# Patient Record
Sex: FEMALE | Race: AMERICAN INDIAN OR ALASKA NATIVE
[De-identification: names, ages, dates, MRNs, and addresses within clinical notes are randomized per-mention and may not be internally consistent; named-entity substitution may affect disease eponyms.]

---

## 2019-09-10 ENCOUNTER — HOSPITAL ENCOUNTER (EMERGENCY)
Dept: HOSPITAL 5 - ED | Age: 29
Discharge: HOME | End: 2019-09-10
Payer: MEDICAID

## 2019-09-10 VITALS — SYSTOLIC BLOOD PRESSURE: 130 MMHG | DIASTOLIC BLOOD PRESSURE: 63 MMHG

## 2019-09-10 DIAGNOSIS — O99.611: Primary | ICD-10-CM

## 2019-09-10 DIAGNOSIS — E28.2: ICD-10-CM

## 2019-09-10 DIAGNOSIS — D27.0: ICD-10-CM

## 2019-09-10 DIAGNOSIS — O34.81: ICD-10-CM

## 2019-09-10 DIAGNOSIS — O23.41: ICD-10-CM

## 2019-09-10 DIAGNOSIS — Z79.899: ICD-10-CM

## 2019-09-10 DIAGNOSIS — Z3A.10: ICD-10-CM

## 2019-09-10 DIAGNOSIS — K92.0: ICD-10-CM

## 2019-09-10 LAB
BASOPHILS # (AUTO): 0 K/MM3 (ref 0–0.1)
BASOPHILS NFR BLD AUTO: 0.4 % (ref 0–1.8)
EOSINOPHIL # BLD AUTO: 0 K/MM3 (ref 0–0.4)
EOSINOPHIL NFR BLD AUTO: 0.3 % (ref 0–4.3)
HCT VFR BLD CALC: 35.4 % (ref 30.3–42.9)
HGB BLD-MCNC: 12.1 GM/DL (ref 10.1–14.3)
LYMPHOCYTES # BLD AUTO: 2.5 K/MM3 (ref 1.2–5.4)
LYMPHOCYTES NFR BLD AUTO: 28.8 % (ref 13.4–35)
MCHC RBC AUTO-ENTMCNC: 34 % (ref 30–34)
MCV RBC AUTO: 90 FL (ref 79–97)
MONOCYTES # (AUTO): 1 K/MM3 (ref 0–0.8)
MONOCYTES % (AUTO): 11.2 % (ref 0–7.3)
PH UR STRIP: 6 [PH] (ref 5–7)
PLATELET # BLD: 282 K/MM3 (ref 140–440)
RBC # BLD AUTO: 3.95 M/MM3 (ref 3.65–5.03)
RBC #/AREA URNS HPF: 2 /HPF (ref 0–6)
UROBILINOGEN UR-MCNC: 2 MG/DL (ref ?–2)
WBC #/AREA URNS HPF: 25 /HPF (ref 0–6)

## 2019-09-10 PROCEDURE — 84702 CHORIONIC GONADOTROPIN TEST: CPT

## 2019-09-10 PROCEDURE — 86900 BLOOD TYPING SEROLOGIC ABO: CPT

## 2019-09-10 PROCEDURE — 36415 COLL VENOUS BLD VENIPUNCTURE: CPT

## 2019-09-10 PROCEDURE — 86901 BLOOD TYPING SEROLOGIC RH(D): CPT

## 2019-09-10 PROCEDURE — 87086 URINE CULTURE/COLONY COUNT: CPT

## 2019-09-10 PROCEDURE — 85025 COMPLETE CBC W/AUTO DIFF WBC: CPT

## 2019-09-10 PROCEDURE — 81001 URINALYSIS AUTO W/SCOPE: CPT

## 2019-09-10 NOTE — EMERGENCY DEPARTMENT REPORT
ED Pregnancy HPI





- General


Chief complaint: Nausea/Vomiting/Diarrhea


Stated complaint: 10 WK PREG/VOMITING BLOOD


Time Seen by Provider: 09/10/19 18:11


Source: patient


Mode of arrival: Ambulatory


Limitations: No Limitations





- History of Present Illness


Initial comments: 





Ramesh is a very pleasant 29-year-old female with history of PCOS and ovarian 

dermoid cyst who is currently 10 weeks 5 days pregnant who presents with 2 

episodes of vomiting over the past 24 hours.  She vomited bright red blood today

one episode.  She currently denies abdominal pain.  No vomiting.  Her OB/GYN Dr. Duran asked her to be evaluated in the emergency department.  She is currently 

symptom-free.  She did not have morning sickness.  This is her first pregnancy. 

Her estimated due date is 2020.  She plans to deliver her baby at [place]

or [facility].


MD Complaint: other (nondescript abdominal pain resolved.  Concern about 

hematemesis)


-: days(s) (1)


Severity: mild


Quality: cramping


Consistency: now resolved


Improves with: none


Worsens with: none


Associated symptoms: nausea/vomiting, other (hematemesis)


Pregnant:: Yes


Number of weeks pregnant: 10


OB History - Current Pregnancy: no complications


OB History - Previous Pregnancies: other (no previous pregnancies)


Pre-christianne care: followed by OB, previous ultrasound confi





- Related Data


                                  Previous Rx's











 Medication  Instructions  Recorded  Last Taken  Type


 


metroNIDAZOLE [Flagyl TAB] 500 mg PO Q12HR #14 tab 12/21/15 Unknown Rx


 


traMADol [Ultram 50 MG tab] 50 mg PO Q6HR PRN #20 tablet 12/21/15 Unknown Rx


 


Nitrofurantoin Mono/M-Cryst 100 mg PO Q12HR 10 Days #20 capsule 09/10/19 Unknown

Rx





[Macrobid CAP]    


 


Promethazine [Phenergan] 25 mg PO Q6HR PRN #15 tab 09/10/19 Unknown Rx











                                    Allergies











Allergy/AdvReac Type Severity Reaction Status Date / Time


 


No Known Allergies Allergy   Unverified 12/20/15 20:41














ED Review of Systems


ROS: 


Stated complaint: 10 WK PREG/VOMITING BLOOD


Other details as noted in HPI





Comment: All other systems reviewed and negative


Constitutional: denies: fever, malaise


Gastrointestinal: abdominal pain, nausea, vomiting





ED Past Medical Hx





- Past Medical History


Previous Medical History?: Yes


Additional medical history: PCOS.  RIGHT OVARIAN DERMOID CYST





- Surgical History


Past Surgical History?: No





- Social History


Smoking Status: Never Smoker


Substance Use Type: None





- Medications


Home Medications: 


                                Home Medications











 Medication  Instructions  Recorded  Confirmed  Last Taken  Type


 


metroNIDAZOLE [Flagyl TAB] 500 mg PO Q12HR #14 tab 12/21/15  Unknown Rx


 


traMADol [Ultram 50 MG tab] 50 mg PO Q6HR PRN #20 tablet 12/21/15  Unknown Rx


 


Nitrofurantoin Mono/M-Cryst 100 mg PO Q12HR 10 Days #20 capsule 09/10/19  

Unknown Rx





[Macrobid CAP]     


 


Promethazine [Phenergan] 25 mg PO Q6HR PRN #15 tab 09/10/19  Unknown Rx














ED Physical Exam





- General


Limitations: No Limitations


General appearance: alert, in no apparent distress, other (smiling, pleasant 

appears well and comfortable)





- Head


Head exam: Present: atraumatic, normocephalic





- Eye


Eye exam: Present: normal appearance





- ENT


ENT exam: Present: mucous membranes moist





- Neck


Neck exam: Present: normal inspection, full ROM





- Respiratory


Respiratory exam: Present: normal lung sounds bilaterally.  Absent: respiratory 

distress, wheezes, rales, rhonchi, stridor





- Cardiovascular


Cardiovascular Exam: Present: regular rate, normal rhythm.  Absent: systolic 

murmur, diastolic murmur, rubs, gallop





- GI/Abdominal


GI/Abdominal exam: Present: soft, normal bowel sounds.  Absent: distended, 

tenderness, guarding, rebound





- Extremities Exam


Extremities exam: Present: normal inspection





- Back Exam


Back exam: Present: normal inspection





- Neurological Exam


Neurological exam: Present: alert, oriented X3





- Psychiatric


Psychiatric exam: Present: normal affect, normal mood





- Skin


Skin exam: Present: warm, dry, intact, normal color.  Absent: rash





ED Course


                                   Vital Signs











  09/10/19 09/10/19 09/10/19





  18:09 21:15 21:31


 


Temperature   


 


Pulse Rate 86  


 


Respiratory 16  





Rate   


 


Blood Pressure   





[Left]   


 


O2 Sat by Pulse 98 100 100





Oximetry   














  09/10/19





  21:43


 


Temperature 98.6 F


 


Pulse Rate 


 


Respiratory 





Rate 


 


Blood Pressure 134/89





[Left] 


 


O2 Sat by Pulse 





Oximetry 














ED Medical Decision Making





- Lab Data


Result diagrams: 


                                 09/10/19 18:27











                         Laboratory Results - last 24 hr











  09/10/19 09/10/19 09/10/19





  18:27 18:27 18:27


 


WBC  8.8  


 


RBC  3.95  


 


Hgb  12.1  


 


Hct  35.4  


 


MCV  90  


 


MCH  31  


 


MCHC  34  


 


RDW  14.0  


 


Plt Count  282  


 


Lymph % (Auto)  28.8  


 


Mono % (Auto)  11.2 H  


 


Eos % (Auto)  0.3  


 


Baso % (Auto)  0.4  


 


Lymph #  2.5  


 


Mono #  1.0 H  


 


Eos #  0.0  


 


Baso #  0.0  


 


Seg Neutrophils %  59.3  


 


Seg Neutrophils #  5.2  


 


HCG, Quant   13782 H 


 


Urine Color   


 


Urine Turbidity   


 


Urine pH   


 


Ur Specific Gravity   


 


Urine Protein   


 


Urine Glucose (UA)   


 


Urine Ketones   


 


Urine Blood   


 


Urine Nitrite   


 


Urine Bilirubin   


 


Urine Urobilinogen   


 


Ur Leukocyte Esterase   


 


Urine WBC (Auto)   


 


Urine RBC (Auto)   


 


Blood Type    B POSITIVE














  09/10/19





  20:09


 


WBC 


 


RBC 


 


Hgb 


 


Hct 


 


MCV 


 


MCH 


 


MCHC 


 


RDW 


 


Plt Count 


 


Lymph % (Auto) 


 


Mono % (Auto) 


 


Eos % (Auto) 


 


Baso % (Auto) 


 


Lymph # 


 


Mono # 


 


Eos # 


 


Baso # 


 


Seg Neutrophils % 


 


Seg Neutrophils # 


 


HCG, Quant 


 


Urine Color  Yellow


 


Urine Turbidity  Hazy


 


Urine pH  6.0


 


Ur Specific Gravity  1.015


 


Urine Protein  30 mg/dl


 


Urine Glucose (UA)  Negative


 


Urine Ketones  Negative


 


Urine Blood  Negative


 


Urine Nitrite  Negative


 


Urine Bilirubin  Negative


 


Urine Urobilinogen  2.0


 


Ur Leukocyte Esterase  Moderate


 


Urine WBC (Auto)  25.0 H


 


Urine RBC (Auto)  2.0


 


Blood Type 














- Medical Decision Making





Mrs. Estimphile resents with 2 episodes of vomiting the last with hematemesis.  

No evidence of active upper GI bleed.  Normal vital signs including blood 

pressure 130/80.  Heart rate 80.  H&H within normal limits.  Patient is blood 

type B positive.  I gave reassurance.  Prescribed promethazine.  Also provided 

patient with copy of labs for her OB/GYN Dr. Duran.  With recent ultrasound do 

not suspect ectopic pregnancy.





White cells noted in urine.  I have prescribed nitrofurantoin.


Critical care attestation.: 


If time is entered above; I have spent that time in minutes in the direct care 

of this critically ill patient, excluding procedure time.








ED Disposition


Clinical Impression: 


 Hematemesis with nausea, First trimester pregnancy, Pyuria





Disposition:  TO HOME OR SELFCARE


Is pt being admited?: No


Does the pt Need Aspirin: No


Condition: Stable


Instructions:  Acute Nausea and Vomiting (ED)


Prescriptions: 


Nitrofurantoin Mono/M-Cryst [Macrobid CAP] 100 mg PO Q12HR 10 Days #20 capsule


Promethazine [Phenergan] 25 mg PO Q6HR PRN #15 tab


 PRN Reason: Nausea


Referrals: 


PRIMARY CARE,MD [Referring] - 2-3 Days

## 2019-09-10 NOTE — EVENT NOTE
ED Screening Note


Date of service: 09/10/19


Time: 18:11


ED Screening Note: 





This is a 29 y.o. F. that presents to the ER with N/V and abdominal cramping.





Vomiting started last night.  Reports hematemesis today.  States abdominal 

cramping resolved.





LMP 06/27/19





Patient is 10 weeks gestation.





This initial assessment/diagnostic orders/clinical plan/treatment(s) is/are 

subject to change based on patients health status, clinical progression and re-

assessment by fellow clinical providers in the ED. Further treatment and workup 

at subsequent clinical providers discretion. Patient/guardian urged not to elope

from the ED as their condition may be serious if not clinically assessed and 

managed. 





Initial orders include: 





Labs

## 2020-03-21 ENCOUNTER — HOSPITAL ENCOUNTER (OUTPATIENT)
Dept: HOSPITAL 5 - TRG | Age: 30
LOS: 1 days | Discharge: STILL ADMITTED OR ADMINISTRATIVE | End: 2020-03-22
Attending: OBSTETRICS & GYNECOLOGY
Payer: MEDICAID

## 2020-03-21 VITALS — SYSTOLIC BLOOD PRESSURE: 131 MMHG | DIASTOLIC BLOOD PRESSURE: 76 MMHG

## 2020-03-21 DIAGNOSIS — S81.812A: ICD-10-CM

## 2020-03-21 DIAGNOSIS — S61.011A: ICD-10-CM

## 2020-03-21 DIAGNOSIS — O47.1: ICD-10-CM

## 2020-03-21 DIAGNOSIS — Z3A.38: ICD-10-CM
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
## 2020-03-21 DIAGNOSIS — O9A.213: Primary | ICD-10-CM
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
## 2020-03-21 PROCEDURE — 76819 FETAL BIOPHYS PROFIL W/O NST: CPT

## 2020-03-21 PROCEDURE — 76815 OB US LIMITED FETUS(S): CPT

## 2020-03-21 PROCEDURE — 96361 HYDRATE IV INFUSION ADD-ON: CPT

## 2020-03-21 PROCEDURE — 96360 HYDRATION IV INFUSION INIT: CPT
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
